# Patient Record
Sex: FEMALE | Race: BLACK OR AFRICAN AMERICAN | NOT HISPANIC OR LATINO | Employment: FULL TIME | URBAN - METROPOLITAN AREA
[De-identification: names, ages, dates, MRNs, and addresses within clinical notes are randomized per-mention and may not be internally consistent; named-entity substitution may affect disease eponyms.]

---

## 2023-03-02 ENCOUNTER — OFFICE VISIT (OUTPATIENT)
Dept: URGENT CARE | Facility: CLINIC | Age: 38
End: 2023-03-02

## 2023-03-02 ENCOUNTER — TELEPHONE (OUTPATIENT)
Dept: OBGYN CLINIC | Facility: OTHER | Age: 38
End: 2023-03-02

## 2023-03-02 DIAGNOSIS — M79.642 BILATERAL HAND PAIN: Primary | ICD-10-CM

## 2023-03-02 DIAGNOSIS — M79.641 BILATERAL HAND PAIN: Primary | ICD-10-CM

## 2023-03-02 NOTE — PROGRESS NOTES
[facility]        NAME: Emily Serna is a 44 y o  female  : 1985    MRN: [id number]  DATE: 2023  TIME: 11:44 AM    Assessment and Plan   Bilateral hand pain [M79 641, M79 642]  1  Bilateral hand pain  Ambulatory Referral to Orthopedic Surgery        Unclear etiology for symptoms  Possible nerve compression  Recommend Ortho evaluation    Patient Instructions       Follow up with PCP in 3-5 days  Proceed to  ER if symptoms worsen  Chief Complaint     Chief Complaint   Patient presents with   • Hand Pain         History of Present Illness       Patient is a 45 yo female who presents for evaluation of bilateral hand pain and numbness x 2-3 weeks  She presents today because a vein in her hand was popping earlier today which has since resolved  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  She denies any trauma or injury  No weakness  She denies any PMH or medications  Review of Systems   Review of Systems   Constitutional: Negative for fever  Musculoskeletal: Positive for arthralgias  Negative for joint swelling  Skin: Negative for color change  Neurological: Positive for numbness  Current Medications     No current outpatient medications on file  Current Allergies     Allergies as of 2023   • (Not on File)            The following portions of the patient's history were reviewed and updated as appropriate: allergies, current medications, past family history, past medical history, past social history, past surgical history and problem list      No past medical history on file  No past surgical history on file  No family history on file  Medications have been verified  Objective   There were no vitals taken for this visit  Physical Exam     Physical Exam  Constitutional:       General: She is not in acute distress  Appearance: She is not toxic-appearing  Cardiovascular:      Rate and Rhythm: Normal rate     Pulmonary: Effort: Pulmonary effort is normal    Musculoskeletal:      Comments: No tenderness of either hand  Full ROM  5/5   Sensation intact and equal in b/l hands in all five digits and palmar and dorsal surfaces    Skin:     General: Skin is warm and dry  Neurological:      Mental Status: She is alert     Psychiatric:         Mood and Affect: Mood normal          Behavior: Behavior normal

## 2023-03-27 ENCOUNTER — OFFICE VISIT (OUTPATIENT)
Dept: OBGYN CLINIC | Facility: CLINIC | Age: 38
End: 2023-03-27

## 2023-03-27 VITALS
WEIGHT: 174 LBS | HEIGHT: 64 IN | BODY MASS INDEX: 29.71 KG/M2 | DIASTOLIC BLOOD PRESSURE: 95 MMHG | HEART RATE: 101 BPM | SYSTOLIC BLOOD PRESSURE: 132 MMHG

## 2023-03-27 DIAGNOSIS — G56.03 CARPAL TUNNEL SYNDROME ON BOTH SIDES: Primary | ICD-10-CM

## 2023-03-27 RX ORDER — TRIAMCINOLONE ACETONIDE 40 MG/ML
20 INJECTION, SUSPENSION INTRA-ARTICULAR; INTRAMUSCULAR
Status: COMPLETED | OUTPATIENT
Start: 2023-03-27 | End: 2023-03-27

## 2023-03-27 RX ORDER — VITAMIN B COMPLEX
1 CAPSULE ORAL DAILY
COMMUNITY

## 2023-03-27 RX ORDER — NAPROXEN 500 MG/1
500 TABLET ORAL 2 TIMES DAILY WITH MEALS
Qty: 30 TABLET | Refills: 0 | Status: SHIPPED | OUTPATIENT
Start: 2023-03-27

## 2023-03-27 RX ORDER — BUPIVACAINE HYDROCHLORIDE 2.5 MG/ML
1 INJECTION, SOLUTION INFILTRATION; PERINEURAL
Status: COMPLETED | OUTPATIENT
Start: 2023-03-27 | End: 2023-03-27

## 2023-03-27 RX ADMIN — BUPIVACAINE HYDROCHLORIDE 1 ML: 2.5 INJECTION, SOLUTION INFILTRATION; PERINEURAL at 15:50

## 2023-03-27 RX ADMIN — TRIAMCINOLONE ACETONIDE 20 MG: 40 INJECTION, SUSPENSION INTRA-ARTICULAR; INTRAMUSCULAR at 15:50

## 2023-03-27 NOTE — PROGRESS NOTES
Assessment/Plan:  Assessment/Plan   Diagnoses and all orders for this visit:    Carpal tunnel syndrome on both sides  -     Ambulatory Referral to PT/OT Hand Therapy; Future  -     naproxen (Naprosyn) 500 mg tablet; Take 1 tablet (500 mg total) by mouth 2 (two) times a day with meals  -     Hand/upper extremity injection: R carpal tunnel    Other orders  -     b complex vitamins capsule; Take 1 capsule by mouth daily        34-year-old right-hand-dominant female with pain and numbness both hands, right worse than left, more than 2 months duration  Discussed with patient physical exam, impression, and plan  There is no bony or soft tissue tenderness of the wrist and hands  She demonstrates intact range of motion and strength wrists and digits both hands  She has decreased sensation to light touch first through fourth digits of the right hand  There is positive Phalen's on the right  She has normal radial pulse both upper extremities  Clinical impression is she has symptoms of carpal tunnel syndrome  I discussed treatment regimen of steroid injection, anti-inflammatory, supplements, and home exercise  I administered mixture of 0 5 cc 0 25% bupivacaine and 0 5 cc Kenalog to right carpal tunnel without complication  She is to continue with cock-up splint at nighttime  She is to take naproxen 500 mg twice daily with food for 2 weeks  She is to apply topical diclofenac gel 3 times a day volar aspect both wrists for the next 10 days  I will refer her to hand therapy to be provided home exercise program   She is to start taking turmeric at least 1000 mg daily and tart cherry at least 1000 mg daily  She will follow-up as needed  Subjective:   Patient ID: Vandana Tavera is a 44 y o  female    Chief Complaint   Patient presents with   • Right Hand - Pain   • Left Hand - Pain     34-year-old right-hand-dominant female presents for evaluation pain and numbness both hands, right worse than left, more than 2 months duration  She denies trauma or inciting event  She works as a cook at [employer]  She reports having pain described as generalized to the hands but worse at the first through fourth digits, gradual in onset, aching and burning and tingling, associated with numbness, worse at nighttime when laying in certain position, and improved with changing position  She was recommended on wearing a splint at nighttime, but she states it does not work  She sometimes takes Tylenol to help with symptoms  She presented to urgent care and suspected of carpal tunnel syndrome and she was referred to orthopedic care  Hand Pain  This is a new problem  The current episode started more than 1 month ago  The problem occurs daily  The problem has been gradually worsening  Associated symptoms include arthralgias and numbness  Pertinent negatives include no abdominal pain, chest pain, chills, fever, joint swelling, rash, sore throat or weakness  She has tried rest, position changes, acetaminophen and immobilization for the symptoms  The treatment provided mild relief  The following portions of the patient's history were reviewed and updated as appropriate: She  has no past medical history on file  She  has a past surgical history that includes  section (2018)  Her family history includes No Known Problems in her father and mother  She  reports that she has never smoked  She has never been exposed to tobacco smoke  She has never used smokeless tobacco  No history on file for alcohol use and drug use  She has No Known Allergies       Review of Systems   Constitutional: Negative for chills and fever  HENT: Negative for sore throat  Eyes: Negative for visual disturbance  Respiratory: Negative for shortness of breath  Cardiovascular: Negative for chest pain  Gastrointestinal: Negative for abdominal pain  Genitourinary: Negative for flank pain  Musculoskeletal: Positive for arthralgias   Negative for "joint swelling  Skin: Negative for rash and wound  Neurological: Positive for numbness  Negative for weakness  Hematological: Does not bruise/bleed easily  Psychiatric/Behavioral: Negative for self-injury  Objective:  Vitals:    03/27/23 1520   BP: 132/95   Pulse: 101   Weight: 78 9 kg (174 lb)   Height: 5' 4\" (1 626 m)     Right Hand Exam     Tenderness   The patient is experiencing no tenderness  Muscle Strength   The patient has normal right wrist strength  Tests   Phalen’s sign: positive  Tinel's sign (median nerve): negative  Finkelstein's test: negative    Other   Sensation: decreased  Pulse: present      Left Hand Exam     Tenderness   The patient is experiencing no tenderness  Muscle Strength   The patient has normal left wrist strength  Tests   Phalen’s Sign: negative  Tinel's sign (median nerve): negative  Finkelstein's test: negative    Other   Sensation: normal  Pulse: present      Right Elbow Exam     Tests   Tinel's sign (cubital tunnel): negative      Left Elbow Exam     Tests   Tinel's sign (cubital tunnel): negative          Strength/Myotome Testing     Left Wrist/Hand   Normal wrist strength    Right Wrist/Hand   Normal wrist strength    Tests     Left Wrist/Hand   Negative Finkelstein's, Phalen's sign, Tinel's sign (medial nerve) and Tinel's sign (radial tunnel)  Right Wrist/Hand   Positive Phalen's sign  Negative Finkelstein's, Tinel's sign (medial nerve) and Tinel's sign (radial tunnel)  Physical Exam  Vitals and nursing note reviewed  Constitutional:       General: She is not in acute distress  Appearance: She is well-developed  She is not ill-appearing or diaphoretic  HENT:      Head: Normocephalic  Left Ear: External ear normal    Eyes:      Conjunctiva/sclera: Conjunctivae normal    Neck:      Trachea: No tracheal deviation  Cardiovascular:      Rate and Rhythm: Normal rate     Pulmonary:      Effort: Pulmonary effort is normal  No " "respiratory distress  Abdominal:      General: There is no distension  Musculoskeletal:         General: No swelling, tenderness, deformity or signs of injury  Skin:     General: Skin is warm and dry  Coloration: Skin is not jaundiced or pale  Neurological:      Mental Status: She is alert and oriented to person, place, and time  Psychiatric:         Mood and Affect: Mood normal          Behavior: Behavior normal          Thought Content: Thought content normal          Judgment: Judgment normal          Hand/upper extremity injection: R carpal tunnel  Carson Protocol:  Consent: Verbal consent obtained  Risks and benefits: risks, benefits and alternatives were discussed  Consent given by: patient  Time out: Immediately prior to procedure a \"time out\" was called to verify the correct patient, procedure, equipment, support staff and site/side marked as required  Patient understanding: patient states understanding of the procedure being performed  Patient consent: the patient's understanding of the procedure matches consent given  Procedure consent: procedure consent matches procedure scheduled  Relevant documents: relevant documents present and verified  Test results: test results available and properly labeled  Site marked: the operative site was marked  Radiology Images displayed and confirmed   If images not available, report reviewed: imaging studies available  Required items: required blood products, implants, devices, and special equipment available  Patient identity confirmed: verbally with patient    Supporting Documentation  Indications: pain   Procedure Details  Condition:carpal tunnel syndrome Site: R carpal tunnel   Preparation: Patient was prepped and draped in the usual sterile fashion  Needle size: 27 G  Ultrasound guidance: no  Medications administered: 1 mL bupivacaine 0 25 %; 20 mg triamcinolone acetonide 40 mg/mL    Patient tolerance: patient tolerated the procedure well with no " immediate complications  Dressing:  Sterile dressing applied

## 2023-04-06 ENCOUNTER — EVALUATION (OUTPATIENT)
Dept: OCCUPATIONAL THERAPY | Facility: CLINIC | Age: 38
End: 2023-04-06

## 2023-04-06 DIAGNOSIS — G56.03 CARPAL TUNNEL SYNDROME ON BOTH SIDES: ICD-10-CM

## 2023-04-06 NOTE — PROGRESS NOTES
OT Evaluation     Today's date: 2023  Patient name: Lisbeth Aviles  : 1985  MRN: [id number]  Referring provider: Corinne Webster  Dx:   Encounter Diagnosis     ICD-10-CM    1  Carpal tunnel syndrome on both sides  G56 03 Ambulatory Referral to PT/OT Hand Therapy                     Assessment  Assessment details: [facility] is a 45 y/o RHD female presenting with bilateral CTS with numbness and pain for more than 3 months  She arrives wearing a right wrist splint which she has been wearing, but not full time  At her last physician appointment, she received a cortisone injection in the right volar wrist which greatly reduced her pain  She also reports improved sleeping at night  Her physician instructed her to take supplements and a topical medication, in which she has been compliant  Examination reveals functional motion and strength  Mild pain since cortisone injection  Absent edema  Reduced sensation in both hands in the median nerve distribution   (+) Tinel's of median nerve and (+) Phalens bilaterally  No atrophy of the thenar noted  Evaluation is of low complexity, due to minimal comorbidities and stable clinical presentation  Pt demonstrates good tolerance of therapy today and was provided with a written HEP focusing on MNGE, TGE, and nerve protection techniques  She was instructed to perform exercises daily  The patient demonstrates HEP instructions appropriately, verbalizes understanding, and is in agreement with the written HEP  Pt will benefit from skilled OT intervention to reduce pain and paresthesias  and allow return to PLOF  She has been provided with a HEP to be followed for the next 4-6 weeks and if symptoms or numbness do not resolve, she should return to her physician for further assessment       Impairments: activity intolerance, impaired physical strength, lacks appropriate home exercise program, pain with function and weight-bearing intolerance  Functional limitations: Daily numbness and pain restrict activity  Symptom irritability: lowUnderstanding of Dx/Px/POC: excellent   Prognosis: good    Goals  STG/LTG    Fannin in HEP including exercises, splint wear, and pain reduction techniques  Plan  Patient would benefit from: OT clarence  Planned therapy interventions: home exercise program  Duration in visits: 1  Plan of Care beginning date: 2023  Plan of Care expiration date: 2023  Treatment plan discussed with: patient        Subjective Evaluation    History of Present Illness  Mechanism of injury: Gradual increase of bilateral pain and numbness          Recurrent probem    Quality of life: good    Pain  Current pain ratin  Location: palms and median nerve distribution  Quality: discomfort and pressure  Relieving factors: rest and support  Progression: improved    Social Support    Employment status: working ([employer] at [employer])  Hand dominance: right    Treatments  Previous treatment: injection treatment, medication and immobilization  Current treatment: occupational therapy  Patient Goals  Patient goals for therapy: decreased pain  Patient goal: Resolve numbness        Objective     Observations     Left Wrist/Hand   Negative for atrophy and edema  Right Wrist/Hand   Negative for atrophy and edema  Neurological Testing     Sensation     Wrist/Hand   Left   Intact: light touch  Diminished: light touch    Right   Intact: light touch  Diminished: light touch    Additional Neurological Details  Steward Lito Screening:    Right   Median nerve 4 31g,  Ulnar nerve 2 83g    Left    Median nerve  3 61g,  Ulnar nerve 2 83g    Active Range of Motion     Left Wrist   Normal active range of motion    Right Wrist   Normal active range of motion    Additional Active Range of Motion Details  Full digital and thumb ROM demonstrated bilaterally       Strength/Myotome Testing     Left Wrist/Hand   Normal wrist strength    Right Wrist/Hand   Normal wrist strength    Additional Strength Details  Cornel #2  L  58 lbs,  R  63 lbs    Lat pinch  L  17 lbs,  R  17 5 lbs  Tests     Left Wrist/Hand   Positive Phalen's sign and Tinel's sign (medial nerve)  Right Wrist/Hand   Positive Phalen's sign and Tinel's sign (medial nerve)  Precautions: diminished sensation of median nerve bilaterally  Access Code: [email or web address]  URL: [email or web address]  Date: 04/06/2023  Prepared by: Madalyn Diogo    Exercises  - Median Nerve Glide  - 3 x daily - 7 x weekly - 1 sets - 5 reps - 5 hold  - Wrist Tendon Gliding  - 3 x daily - 7 x weekly - 1 sets - 10 reps    Date 4/5                                                                Neuro Re-Ed                                                                                                        Ther Ex             HEP MNGE  TGE  Splint wear  Pain reduce                                                                                                       Ther Activity                                       Gait Training                                       Modalities             MHP HEP

## 2023-05-19 ENCOUNTER — OFFICE VISIT (OUTPATIENT)
Dept: URGENT CARE | Facility: CLINIC | Age: 38
End: 2023-05-19

## 2023-05-19 VITALS
OXYGEN SATURATION: 100 % | RESPIRATION RATE: 18 BRPM | HEIGHT: 64 IN | SYSTOLIC BLOOD PRESSURE: 158 MMHG | BODY MASS INDEX: 47.08 KG/M2 | TEMPERATURE: 97.9 F | WEIGHT: 275.8 LBS | HEART RATE: 81 BPM | DIASTOLIC BLOOD PRESSURE: 95 MMHG

## 2023-05-19 DIAGNOSIS — R10.31 RLQ ABDOMINAL PAIN: Primary | ICD-10-CM

## 2023-05-19 NOTE — PROGRESS NOTES
[facility]        NAME: Grzegorz Martinez is a 44 y o  female  : 1985    MRN: [id number]  DATE: May 19, 2023  TIME: 2:34 PM    Assessment and Plan   RLQ abdominal pain [R10 31]  1  RLQ abdominal pain              Patient Instructions   Discussed per policy we do not do pelvic exams  Advised for her to proceed to the ER for further work up of RLQ pain  Patient signed Karla Hoang- risks of not going to the ER addressed and written on AMA-patient notes she verbal understands  Follow up with PCP in 3-5 days  Proceed to  ER if symptoms worsen  Chief Complaint     Chief Complaint   Patient presents with   • Pelvic Pain     Right sided pain started this am  Aspirin taken without relief  History of Present Illness       HPI  This is a 43 y/o female here c/o intermittent RLQ pain since this morning  Patient rates the pain 6/10  + nausea but denies vomiting or diarrhea  Denies urinary symptoms, back pain, vaginal itching, burning, abnormal discharge  Denies fevers, chills, SOB, Chest pain  LMP 23-had tubal ligation and denies chance of pregnancy  Still has an appendix  Review of Systems   Review of Systems   Constitutional: Negative for chills and fever  Respiratory: Negative for shortness of breath  Cardiovascular: Negative for chest pain  Gastrointestinal: Positive for abdominal pain  Negative for diarrhea, nausea and vomiting  Genitourinary: Negative for dysuria, flank pain, frequency, urgency, vaginal bleeding, vaginal discharge and vaginal pain           Current Medications       Current Outpatient Medications:   •  b complex vitamins capsule, Take 1 capsule by mouth daily, Disp: , Rfl:   •  naproxen (Naprosyn) 500 mg tablet, Take 1 tablet (500 mg total) by mouth 2 (two) times a day with meals (Patient not taking: Reported on 2023), Disp: 30 tablet, Rfl: 0    Current Allergies     Allergies as of 2023   • (No Known Allergies)            The following portions of the "patient's history were reviewed and updated as appropriate: allergies, current medications, past family history, past medical history, past social history, past surgical history and problem list      History reviewed  No pertinent past medical history  Past Surgical History:   Procedure Laterality Date   •  SECTION  2018       Family History   Problem Relation Age of Onset   • No Known Problems Mother    • No Known Problems Father          Medications have been verified  Objective   /95   Pulse 81   Temp 97 9 °F (36 6 °C)   Resp 18   Ht 5' 4\" (1 626 m)   Wt 125 kg (275 lb 12 8 oz)   SpO2 100%   BMI 47 34 kg/m²        Physical Exam     Physical Exam  Vitals and nursing note reviewed  Constitutional:       Appearance: Normal appearance  She is normal weight  Cardiovascular:      Rate and Rhythm: Normal rate and regular rhythm  Pulmonary:      Effort: Pulmonary effort is normal       Breath sounds: Normal breath sounds  Abdominal:      General: Bowel sounds are normal       Palpations: Abdomen is soft  Tenderness: There is no abdominal tenderness  There is no right CVA tenderness, left CVA tenderness, guarding or rebound  Neurological:      Mental Status: She is alert                     "

## 2024-02-27 ENCOUNTER — HOSPITAL ENCOUNTER (EMERGENCY)
Facility: HOSPITAL | Age: 39
Discharge: HOME | End: 2024-02-27
Attending: STUDENT IN AN ORGANIZED HEALTH CARE EDUCATION/TRAINING PROGRAM
Payer: COMMERCIAL

## 2024-02-27 ENCOUNTER — APPOINTMENT (EMERGENCY)
Dept: RADIOLOGY | Facility: HOSPITAL | Age: 39
End: 2024-02-27
Payer: COMMERCIAL

## 2024-02-27 VITALS
RESPIRATION RATE: 18 BRPM | DIASTOLIC BLOOD PRESSURE: 79 MMHG | HEART RATE: 81 BPM | SYSTOLIC BLOOD PRESSURE: 139 MMHG | OXYGEN SATURATION: 100 % | TEMPERATURE: 97.8 F

## 2024-02-27 DIAGNOSIS — R06.2 WHEEZING: Primary | ICD-10-CM

## 2024-02-27 DIAGNOSIS — R04.0 EPISTAXIS: ICD-10-CM

## 2024-02-27 LAB
FLUAV RNA RESP QL NAA+PROBE: NEGATIVE
FLUBV RNA RESP QL NAA+PROBE: NEGATIVE
RSV RNA RESP QL NAA+PROBE: NEGATIVE
SARS-COV-2 RNA RESP QL NAA+PROBE: NEGATIVE

## 2024-02-27 PROCEDURE — 99284 EMERGENCY DEPT VISIT MOD MDM: CPT

## 2024-02-27 PROCEDURE — 99284 EMERGENCY DEPT VISIT MOD MDM: CPT | Performed by: STUDENT IN AN ORGANIZED HEALTH CARE EDUCATION/TRAINING PROGRAM

## 2024-02-27 PROCEDURE — 71046 X-RAY EXAM CHEST 2 VIEWS: CPT

## 2024-02-27 PROCEDURE — 0241U HB NFCT DS VIR RESP RNA 4 TRGT: CPT | Performed by: STUDENT IN AN ORGANIZED HEALTH CARE EDUCATION/TRAINING PROGRAM

## 2024-02-27 RX ORDER — ALBUTEROL SULFATE 90 UG/1
2 AEROSOL, METERED RESPIRATORY (INHALATION) ONCE
Status: COMPLETED | OUTPATIENT
Start: 2024-02-27 | End: 2024-02-27

## 2024-02-27 RX ADMIN — ALBUTEROL SULFATE 2 PUFF: 90 AEROSOL, METERED RESPIRATORY (INHALATION) at 16:40

## 2024-02-27 NOTE — ED PROVIDER NOTES
History  Chief Complaint   Patient presents with    Flu Symptoms     Pt presents with c/o wheezing. States she had a cold and symptoms subsided but reports she has been wheezing, also reports L sided nose bleed. No active bleeding during triage.      HPI    Patient is a 38-year-old female present emerged department for intermittent nosebleeds.  Bleeds have been lasting for a very short period of time and always stopped.  Patient is on a blood thinning medication.  Patient also reports hearing wheezing when she lies down.  She denies any chest pain, shortness of breath or difficulty breathing.  She denies any recent long travel.  Denies any fevers chills or URI symptoms.  Patient has a history of asthma.  No other pertinent past medical history.    Prior to Admission Medications   Prescriptions Last Dose Informant Patient Reported? Taking?   b complex vitamins capsule   Yes No   Sig: Take 1 capsule by mouth daily   naproxen (Naprosyn) 500 mg tablet   No No   Sig: Take 1 tablet (500 mg total) by mouth 2 (two) times a day with meals   Patient not taking: Reported on 2023      Facility-Administered Medications: None       History reviewed. No pertinent past medical history.    Past Surgical History:   Procedure Laterality Date     SECTION  2018       Family History   Problem Relation Age of Onset    No Known Problems Mother     No Known Problems Father      I have reviewed and agree with the history as documented.    E-Cigarette/Vaping     E-Cigarette/Vaping Substances     Social History     Tobacco Use    Smoking status: Never     Passive exposure: Never    Smokeless tobacco: Never   Substance Use Topics    Alcohol use: Never    Drug use: Never       Review of Systems   Constitutional:  Negative for chills and fever.   HENT:  Positive for nosebleeds. Negative for ear pain and sore throat.    Eyes:  Negative for pain and visual disturbance.   Respiratory:  Positive for wheezing. Negative for cough and  shortness of breath.    Cardiovascular:  Negative for chest pain and palpitations.   Gastrointestinal:  Negative for abdominal pain and vomiting.   Genitourinary:  Negative for dysuria and hematuria.   Musculoskeletal:  Negative for arthralgias and back pain.   Skin:  Negative for color change and rash.   Neurological:  Negative for seizures and syncope.   All other systems reviewed and are negative.      Physical Exam  Physical Exam  Vitals and nursing note reviewed.   Constitutional:       General: She is not in acute distress.     Appearance: She is well-developed.   HENT:      Head: Normocephalic and atraumatic.      Right Ear: There is impacted cerumen.      Left Ear: Tympanic membrane and ear canal normal.      Nose:      Comments: Left side abrasion on turbinated. Not actively bleeding     Mouth/Throat:      Mouth: Mucous membranes are moist.      Pharynx: Oropharynx is clear.   Eyes:      Extraocular Movements: Extraocular movements intact.      Conjunctiva/sclera: Conjunctivae normal.      Pupils: Pupils are equal, round, and reactive to light.   Cardiovascular:      Rate and Rhythm: Normal rate and regular rhythm.      Heart sounds: No murmur heard.  Pulmonary:      Effort: Pulmonary effort is normal. No respiratory distress.      Breath sounds: Normal breath sounds.   Abdominal:      Palpations: Abdomen is soft.      Tenderness: There is no abdominal tenderness.   Musculoskeletal:         General: No swelling.      Cervical back: Neck supple.   Skin:     General: Skin is warm and dry.      Capillary Refill: Capillary refill takes less than 2 seconds.   Neurological:      Mental Status: She is alert.   Psychiatric:         Mood and Affect: Mood normal.         Vital Signs  ED Triage Vitals [02/27/24 1417]   Temperature Pulse Respirations Blood Pressure SpO2   97.8 °F (36.6 °C) 81 18 139/79 100 %      Temp Source Heart Rate Source Patient Position - Orthostatic VS BP Location FiO2 (%)   Temporal Monitor  Sitting Left arm --      Pain Score       --           Vitals:    02/27/24 1417   BP: 139/79   Pulse: 81   Patient Position - Orthostatic VS: Sitting         Visual Acuity      ED Medications  Medications - No data to display    Diagnostic Studies  Results Reviewed       Procedure Component Value Units Date/Time    COVID/FLU/RSV [[id number]]  (Normal) Collected: 02/27/24 1421    Lab Status: Final result Specimen: Nares from Nose Updated: 02/27/24 1505     SARS-CoV-2 Negative     INFLUENZA A PCR Negative     INFLUENZA B PCR Negative     RSV PCR Negative    Narrative:      FOR PEDIATRIC PATIENTS - copy/paste COVID Guidelines URL to browser: [email or web address]    SARS-CoV-2 assay is a Nucleic Acid Amplification assay intended for the  qualitative detection of nucleic acid from SARS-CoV-2 in nasopharyngeal  swabs. Results are for the presumptive identification of SARS-CoV-2 RNA.    Positive results are indicative of infection with SARS-CoV-2, the virus  causing COVID-19, but do not rule out bacterial infection or co-infection  with other viruses. Laboratories within the [place] and its  territories are required to report all positive results to the appropriate  public health authorities. Negative results do not preclude SARS-CoV-2  infection and should not be used as the sole basis for treatment or other  patient management decisions. Negative results must be combined with  clinical observations, patient history, and epidemiological information.  This test has not been FDA cleared or approved.    This test has been authorized by FDA under an Emergency Use Authorization  (EUA). This test is only authorized for the duration of time the  declaration that circumstances exist justifying the authorization of the  emergency use of an in vitro diagnostic tests for detection of SARS-CoV-2  virus and/or diagnosis of COVID-19 infection under section 564(b)(1) of  the  Act, 21 U.S.C. 360bbb-3(b)(1), unless the authorization is terminated  or revoked sooner. The test has been validated but independent review by FDA  and CLIA is pending.    Test performed using [employer] Gene[employer]pert: This RT-PCR assay targets N2,  a region unique to SARS-CoV-2. A conserved region in the E-gene was chosen  for pan-Sarbecovirus detection which includes SARS-CoV-2.    According to CMS-2020-01-R, this platform meets the definition of high-throughput technology.                   XR chest 2 views    (Results Pending)              Procedures  Procedures         ED Course                                             Medical Decision Making  Amount and/or Complexity of Data Reviewed  Radiology: ordered.             Disposition  Final diagnoses:   None     ED Disposition       None          Follow-up Information    None         Patient's Medications   Discharge Prescriptions    No medications on file       No discharge procedures on file.    PDMP Review       None            ED Provider  Electronically Signed by           Date/Time   Tue Feb 27, 2024  5:20 PM    Comment   Hollie Carlos discharge to home/self care.                   Follow-up Information       Follow up With Specialties Details Why Contact Info    [facility]  Schedule an appointment as soon as possible for a visit   [phone]              Discharge Medication List as of 2/27/2024  5:20 PM        CONTINUE these medications which have NOT CHANGED    Details   b complex vitamins capsule Take 1 capsule by mouth daily, Historical Med      naproxen (Naprosyn) 500 mg tablet Take 1 tablet (500 mg total) by mouth 2 (two) times a day with meals, Starting Mon 3/27/2023, Normal             No discharge procedures on file.    PDMP Review       None            ED Provider  Electronically Signed by             Di Ocasio DO  03/08/24 [id number]

## 2024-02-27 NOTE — DISCHARGE INSTRUCTIONS
You can use over-the-counter medication as needed for discomfort.  You can try saline spray and Vaseline to help keep your nose moist.    Follow-up with your primary care physician for reevaluation.    Return if you develop any new or worsening symptoms such as chest pain, shortness of breath or difficulty breathing.